# Patient Record
Sex: MALE | Race: WHITE | NOT HISPANIC OR LATINO | URBAN - METROPOLITAN AREA
[De-identification: names, ages, dates, MRNs, and addresses within clinical notes are randomized per-mention and may not be internally consistent; named-entity substitution may affect disease eponyms.]

---

## 2022-02-23 ENCOUNTER — EMERGENCY (EMERGENCY)
Facility: HOSPITAL | Age: 77
LOS: 1 days | Discharge: HOME | End: 2022-02-23
Attending: EMERGENCY MEDICINE
Payer: COMMERCIAL

## 2022-02-23 VITALS
RESPIRATION RATE: 16 BRPM | OXYGEN SATURATION: 93 % | WEIGHT: 235.01 LBS | HEART RATE: 66 BPM | TEMPERATURE: 97 F | SYSTOLIC BLOOD PRESSURE: 89 MMHG | DIASTOLIC BLOOD PRESSURE: 52 MMHG

## 2022-02-23 DIAGNOSIS — Z98.890 OTHER SPECIFIED POSTPROCEDURAL STATES: Chronic | ICD-10-CM

## 2022-02-23 LAB
ALBUMIN SERPL ELPH-MCNC: 4 G/DL — SIGNIFICANT CHANGE UP (ref 3.3–5.2)
ALP SERPL-CCNC: 93 U/L — SIGNIFICANT CHANGE UP (ref 40–120)
ALT FLD-CCNC: 8 U/L — SIGNIFICANT CHANGE UP
ANION GAP SERPL CALC-SCNC: 16 MMOL/L — SIGNIFICANT CHANGE UP (ref 5–17)
APTT BLD: 27.9 SEC — SIGNIFICANT CHANGE UP (ref 27.5–35.5)
AST SERPL-CCNC: 12 U/L — SIGNIFICANT CHANGE UP
BASOPHILS # BLD AUTO: 0.06 K/UL — SIGNIFICANT CHANGE UP (ref 0–0.2)
BASOPHILS # BLD AUTO: 0.07 K/UL — SIGNIFICANT CHANGE UP (ref 0–0.2)
BASOPHILS NFR BLD AUTO: 0.5 % — SIGNIFICANT CHANGE UP (ref 0–2)
BASOPHILS NFR BLD AUTO: 0.6 % — SIGNIFICANT CHANGE UP (ref 0–2)
BILIRUB SERPL-MCNC: 0.4 MG/DL — SIGNIFICANT CHANGE UP (ref 0.4–2)
BUN SERPL-MCNC: 31.5 MG/DL — HIGH (ref 8–20)
CALCIUM SERPL-MCNC: 9 MG/DL — SIGNIFICANT CHANGE UP (ref 8.6–10.2)
CHLORIDE SERPL-SCNC: 105 MMOL/L — SIGNIFICANT CHANGE UP (ref 98–107)
CO2 SERPL-SCNC: 25 MMOL/L — SIGNIFICANT CHANGE UP (ref 22–29)
COHGB MFR BLDV: 2.7 % — SIGNIFICANT CHANGE UP
CREAT SERPL-MCNC: 1.7 MG/DL — HIGH (ref 0.5–1.3)
EOSINOPHIL # BLD AUTO: 0.2 K/UL — SIGNIFICANT CHANGE UP (ref 0–0.5)
EOSINOPHIL # BLD AUTO: 0.27 K/UL — SIGNIFICANT CHANGE UP (ref 0–0.5)
EOSINOPHIL NFR BLD AUTO: 1.6 % — SIGNIFICANT CHANGE UP (ref 0–6)
EOSINOPHIL NFR BLD AUTO: 2.4 % — SIGNIFICANT CHANGE UP (ref 0–6)
GLUCOSE SERPL-MCNC: 121 MG/DL — HIGH (ref 70–99)
HCT VFR BLD CALC: 45.7 % — SIGNIFICANT CHANGE UP (ref 39–50)
HCT VFR BLD CALC: 46.9 % — SIGNIFICANT CHANGE UP (ref 39–50)
HGB BLD CALC-MCNC: 15.5 G/DL — SIGNIFICANT CHANGE UP (ref 12.6–17.4)
HGB BLD-MCNC: 15.1 G/DL — SIGNIFICANT CHANGE UP (ref 13–17)
HGB BLD-MCNC: 15.5 G/DL — SIGNIFICANT CHANGE UP (ref 13–17)
IMM GRANULOCYTES NFR BLD AUTO: 0.3 % — SIGNIFICANT CHANGE UP (ref 0–1.5)
IMM GRANULOCYTES NFR BLD AUTO: 0.4 % — SIGNIFICANT CHANGE UP (ref 0–1.5)
INR BLD: 1.07 RATIO — SIGNIFICANT CHANGE UP (ref 0.88–1.16)
LYMPHOCYTES # BLD AUTO: 1.97 K/UL — SIGNIFICANT CHANGE UP (ref 1–3.3)
LYMPHOCYTES # BLD AUTO: 17.4 % — SIGNIFICANT CHANGE UP (ref 13–44)
LYMPHOCYTES # BLD AUTO: 17.6 % — SIGNIFICANT CHANGE UP (ref 13–44)
LYMPHOCYTES # BLD AUTO: 2.14 K/UL — SIGNIFICANT CHANGE UP (ref 1–3.3)
MCHC RBC-ENTMCNC: 29.3 PG — SIGNIFICANT CHANGE UP (ref 27–34)
MCHC RBC-ENTMCNC: 29.4 PG — SIGNIFICANT CHANGE UP (ref 27–34)
MCHC RBC-ENTMCNC: 33 GM/DL — SIGNIFICANT CHANGE UP (ref 32–36)
MCHC RBC-ENTMCNC: 33 GM/DL — SIGNIFICANT CHANGE UP (ref 32–36)
MCV RBC AUTO: 88.6 FL — SIGNIFICANT CHANGE UP (ref 80–100)
MCV RBC AUTO: 89 FL — SIGNIFICANT CHANGE UP (ref 80–100)
MONOCYTES # BLD AUTO: 0.6 K/UL — SIGNIFICANT CHANGE UP (ref 0–0.9)
MONOCYTES # BLD AUTO: 0.74 K/UL — SIGNIFICANT CHANGE UP (ref 0–0.9)
MONOCYTES NFR BLD AUTO: 5.3 % — SIGNIFICANT CHANGE UP (ref 2–14)
MONOCYTES NFR BLD AUTO: 6.1 % — SIGNIFICANT CHANGE UP (ref 2–14)
NEUTROPHILS # BLD AUTO: 8.4 K/UL — HIGH (ref 1.8–7.4)
NEUTROPHILS # BLD AUTO: 9.01 K/UL — HIGH (ref 1.8–7.4)
NEUTROPHILS NFR BLD AUTO: 73.9 % — SIGNIFICANT CHANGE UP (ref 43–77)
NEUTROPHILS NFR BLD AUTO: 73.9 % — SIGNIFICANT CHANGE UP (ref 43–77)
NT-PROBNP SERPL-SCNC: 184 PG/ML — SIGNIFICANT CHANGE UP (ref 0–300)
PLATELET # BLD AUTO: 165 K/UL — SIGNIFICANT CHANGE UP (ref 150–400)
PLATELET # BLD AUTO: 218 K/UL — SIGNIFICANT CHANGE UP (ref 150–400)
POTASSIUM SERPL-MCNC: 4 MMOL/L — SIGNIFICANT CHANGE UP (ref 3.5–5.3)
POTASSIUM SERPL-SCNC: 4 MMOL/L — SIGNIFICANT CHANGE UP (ref 3.5–5.3)
PROT SERPL-MCNC: 6.8 G/DL — SIGNIFICANT CHANGE UP (ref 6.6–8.7)
PROTHROM AB SERPL-ACNC: 12.4 SEC — SIGNIFICANT CHANGE UP (ref 10.5–13.4)
RBC # BLD: 5.16 M/UL — SIGNIFICANT CHANGE UP (ref 4.2–5.8)
RBC # BLD: 5.27 M/UL — SIGNIFICANT CHANGE UP (ref 4.2–5.8)
RBC # FLD: 13.4 % — SIGNIFICANT CHANGE UP (ref 10.3–14.5)
RBC # FLD: 13.5 % — SIGNIFICANT CHANGE UP (ref 10.3–14.5)
SODIUM SERPL-SCNC: 146 MMOL/L — HIGH (ref 135–145)
TROPONIN T SERPL-MCNC: 0.01 NG/ML — SIGNIFICANT CHANGE UP (ref 0–0.06)
WBC # BLD: 11.35 K/UL — HIGH (ref 3.8–10.5)
WBC # BLD: 12.19 K/UL — HIGH (ref 3.8–10.5)
WBC # FLD AUTO: 11.35 K/UL — HIGH (ref 3.8–10.5)
WBC # FLD AUTO: 12.19 K/UL — HIGH (ref 3.8–10.5)

## 2022-02-23 PROCEDURE — G1004: CPT

## 2022-02-23 PROCEDURE — 99220: CPT | Mod: 25

## 2022-02-23 PROCEDURE — 70450 CT HEAD/BRAIN W/O DYE: CPT | Mod: 26,MG

## 2022-02-23 PROCEDURE — 70486 CT MAXILLOFACIAL W/O DYE: CPT | Mod: 26,MG

## 2022-02-23 PROCEDURE — 72125 CT NECK SPINE W/O DYE: CPT | Mod: 26,MG

## 2022-02-23 PROCEDURE — 71275 CT ANGIOGRAPHY CHEST: CPT | Mod: 26,ME

## 2022-02-23 PROCEDURE — 93010 ELECTROCARDIOGRAM REPORT: CPT | Mod: 76

## 2022-02-23 PROCEDURE — 12011 RPR F/E/E/N/L/M 2.5 CM/<: CPT

## 2022-02-23 PROCEDURE — 71045 X-RAY EXAM CHEST 1 VIEW: CPT | Mod: 26

## 2022-02-23 RX ORDER — CARVEDILOL PHOSPHATE 80 MG/1
25 CAPSULE, EXTENDED RELEASE ORAL EVERY 12 HOURS
Refills: 0 | Status: DISCONTINUED | OUTPATIENT
Start: 2022-02-23 | End: 2022-02-28

## 2022-02-23 RX ORDER — SODIUM CHLORIDE 9 MG/ML
500 INJECTION INTRAMUSCULAR; INTRAVENOUS; SUBCUTANEOUS ONCE
Refills: 0 | Status: COMPLETED | OUTPATIENT
Start: 2022-02-23 | End: 2022-02-23

## 2022-02-23 RX ORDER — IPRATROPIUM/ALBUTEROL SULFATE 18-103MCG
3 AEROSOL WITH ADAPTER (GRAM) INHALATION ONCE
Refills: 0 | Status: COMPLETED | OUTPATIENT
Start: 2022-02-23 | End: 2022-02-23

## 2022-02-23 RX ORDER — ATORVASTATIN CALCIUM 80 MG/1
10 TABLET, FILM COATED ORAL AT BEDTIME
Refills: 0 | Status: DISCONTINUED | OUTPATIENT
Start: 2022-02-23 | End: 2022-02-28

## 2022-02-23 RX ORDER — FOLIC ACID 0.8 MG
1 TABLET ORAL DAILY
Refills: 0 | Status: DISCONTINUED | OUTPATIENT
Start: 2022-02-23 | End: 2022-02-28

## 2022-02-23 RX ORDER — LISINOPRIL 2.5 MG/1
20 TABLET ORAL
Refills: 0 | Status: DISCONTINUED | OUTPATIENT
Start: 2022-02-23 | End: 2022-02-28

## 2022-02-23 RX ORDER — TETANUS TOXOID, REDUCED DIPHTHERIA TOXOID AND ACELLULAR PERTUSSIS VACCINE, ADSORBED 5; 2.5; 8; 8; 2.5 [IU]/.5ML; [IU]/.5ML; UG/.5ML; UG/.5ML; UG/.5ML
0.5 SUSPENSION INTRAMUSCULAR ONCE
Refills: 0 | Status: COMPLETED | OUTPATIENT
Start: 2022-02-23 | End: 2022-02-23

## 2022-02-23 RX ORDER — TAMSULOSIN HYDROCHLORIDE 0.4 MG/1
0.4 CAPSULE ORAL AT BEDTIME
Refills: 0 | Status: DISCONTINUED | OUTPATIENT
Start: 2022-02-23 | End: 2022-02-28

## 2022-02-23 RX ORDER — DILTIAZEM HCL 120 MG
120 CAPSULE, EXT RELEASE 24 HR ORAL DAILY
Refills: 0 | Status: DISCONTINUED | OUTPATIENT
Start: 2022-02-23 | End: 2022-02-28

## 2022-02-23 RX ORDER — PANTOPRAZOLE SODIUM 20 MG/1
40 TABLET, DELAYED RELEASE ORAL
Refills: 0 | Status: DISCONTINUED | OUTPATIENT
Start: 2022-02-23 | End: 2022-02-28

## 2022-02-23 RX ADMIN — TAMSULOSIN HYDROCHLORIDE 0.4 MILLIGRAM(S): 0.4 CAPSULE ORAL at 23:51

## 2022-02-23 RX ADMIN — SODIUM CHLORIDE 500 MILLILITER(S): 9 INJECTION INTRAMUSCULAR; INTRAVENOUS; SUBCUTANEOUS at 15:29

## 2022-02-23 RX ADMIN — Medication 3 MILLILITER(S): at 15:29

## 2022-02-23 RX ADMIN — Medication 1 TABLET(S): at 19:06

## 2022-02-23 RX ADMIN — Medication 1 TABLET(S): at 23:50

## 2022-02-23 RX ADMIN — TETANUS TOXOID, REDUCED DIPHTHERIA TOXOID AND ACELLULAR PERTUSSIS VACCINE, ADSORBED 0.5 MILLILITER(S): 5; 2.5; 8; 8; 2.5 SUSPENSION INTRAMUSCULAR at 15:27

## 2022-02-23 RX ADMIN — ATORVASTATIN CALCIUM 10 MILLIGRAM(S): 80 TABLET, FILM COATED ORAL at 23:51

## 2022-02-23 NOTE — ED CDU PROVIDER INITIAL DAY NOTE - NSICDXFAMILYHX_GEN_ALL_CORE_FT
FAMILY HISTORY:  Mother  Still living? Unknown  FH: dementia, Age at diagnosis: Age Unknown  FH: hypertension, Age at diagnosis: Age Unknown

## 2022-02-23 NOTE — CONSULT NOTE ADULT - PROBLEM SELECTOR RECOMMENDATION 2
Pt stable at this time with minimal crackles at base b/l, no pitting edema with normal  p-BNP (184).  Continue home medications Lisinopril 20 mg BID, Diltiazem 120 CD q am, Carvedilol 25 mg BID and Torsemide 20 mg with strict parameters  Strict I&Os. Daily weight monitor. Repeat p-BNP in AM  DASH diet    Quit smoking. Discussed in length with patient. Pt no ready to quit now Pt stable at this time with minimal crackles at base b/l, no pitting edema with normal  p-BNP (184). No edema or effusion noted on CTA of chest.   Continue home medications Lisinopril 20 mg BID, Diltiazem 120 CD q am, Carvedilol 25 mg BID and Torsemide 20 mg with strict parameters  Strict I&Os. Daily weight monitor. Repeat p-BNP in AM  Echo in AM to assess structural and functional status   Maintain K+~4 and mag ~2  DASH diet  Quit smoking. Discussed in length with patient. Pt is no ready to quit now

## 2022-02-23 NOTE — ED ADULT TRIAGE NOTE - CHIEF COMPLAINT QUOTE
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 Pt denies having had any chest pain/SOB or dizziness prior to syncope. Pt reports he feels fine at time of triage. 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 Pt denies having had any chest pain/SOB or dizziness prior to syncope. Pt reports he feels fine at time of triage but is noted to have wound to left forehead from fall.

## 2022-02-23 NOTE — ED CDU PROVIDER INITIAL DAY NOTE - PHYSICAL EXAMINATION
Gen: Alert, NAD  Head: NC, PERRL, EOMI, normal lids/conjunctiva, 1cm laceration with contusion over left forehead, 0.5 cm laceration over superior nasal bridge  ENT: B TM WNL, no nasal septal hematoma,  Neck: +supple, no tenderness/meningismus/JVD, +Trachea midline  Pulm: Bilateral BS, normal resp effort, , bilateral inspiratory and expiratory wheezing, + bilateral basilar crackles  CV: RRR, no M/R/G, +dist pulses  Abd: soft, NT/ND, +BS, no hepatosplenomegaly  Mskel:    L UE: from/nt @shoulder/elbow/wrist/hand   R UE: from/nt @shoulder/elbow/wrist/hand   L LE: from/nt @ hip/knee/ankle, 1+  pitting edema    R LE: from/nt @hip/knee/ankle, 1+ pitting edema   distal pulses intact  BACK: nt midline c/t/l/s spine.   Skin: no rash   Neuro: AAOx3, no sensory/motor deficit

## 2022-02-23 NOTE — ED PROVIDER NOTE - OBJECTIVE STATEMENT
HPI:  75 y/o M; with PMH signif for CHF, COPD, HTN, HLD ; now p/w s/p syncopal event. Pt state he did not eat today, state was outside watching [employer] procession sat in car for 15 minutes when got out from car synopsized for 1-2 when he awoke he had mild dyspnea on exertion. Pt denies chest pain or palpitations prior to syncope, N/V d  PMH:CHF, COPD, HTN, HLD  SOCIAL: +social EtOH use, + tobacco use 2 PPD HPI:  75 y/o M; with PMH signif for CHF, COPD, HTN, HLD ; now p/w s/p syncopal event. Pt state he did not eat today, state was outside watching [employer] procession sat in car for 15 minutes when got out from car synopsized for 1-2 when he awoke he had mild dyspnea on exertion. Pt denies chest pain or palpitations prior to syncope, N/V d.  also states he had an angioplasty of R LE about 1 month ago.  denies chest pain or sob.    PMH:CHF, COPD, HTN, HLD  SOCIAL: +social EtOH use, + tobacco use 2 PPD

## 2022-02-23 NOTE — CONSULT NOTE ADULT - ATTENDING COMMENTS
Pt is seen, examined, chart reviewed, d/w np/pa.  Management as outlined above.  Unwitnessed  syncope: Pt states urinary incontinence today. He woke up after this episode and his "pants were wet". Neuro evaluation  is recommended to exclude seizures.  Avoid dehydration is recommended.   Carotid Doppler:  No significant hemodynamic stenosis of either carotid artery.  Echo: Summary:  Left ventricular ejection fraction, by visual estimation, is 60 to 65%. No significant valvular abnl.  Outpatient cardiology FU is recommended.

## 2022-02-23 NOTE — ED ADULT NURSE NOTE - CHIEF COMPLAINT QUOTE
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 Pt denies having had any chest pain/SOB or dizziness prior to syncope. Pt reports he feels fine at time of triage but is noted to have wound to left forehead from fall.

## 2022-02-23 NOTE — ED PROVIDER NOTE - PHYSICAL EXAMINATION
Gen: Alert, NAD  Head: NC, PERRL, EOMI, normal lids/conjunctiva, 1cm laceration with contusion over left forehead, 0.5 cm laceration over superior nasal bridge  ENT: B TM WNL, normal hearing, patent oropharynx without erythema/exudate, uvula midline, no nasal septal hematoma,  Neck: +supple, no tenderness/meningismus/JVD, +Trachea midline  Pulm: Bilateral BS, normal resp effort, , bilateral inspiratory and expiratory wheezing, + bilateral basilar crackles  CV: RRR, no M/R/G, +dist pulses  Abd: soft, NT/ND, +BS, no hepatosplenomegaly  Mskel:    L UE: from/nt @shoulder/elbow/wrist/hand   R UE: from/nt @shoulder/elbow/wrist/hand   L LE: from/nt @ hip/knee/ankle, 1+  pitting edema    R LE: from/nt @hip/knee/ankle, 1+ pitting edema   distal pulses intact  BACK: nt midline c/t/l/s spine.   Skin: no rash   Neuro: AAOx3, no sensory/motor deficit Gen: Alert, NAD  Head: NC, PERRL, EOMI, normal lids/conjunctiva, 1cm laceration with contusion over left forehead, 0.5 cm laceration over superior nasal bridge  ENT: B TM WNL, no nasal septal hematoma,  Neck: +supple, no tenderness/meningismus/JVD, +Trachea midline  Pulm: Bilateral BS, normal resp effort, , bilateral inspiratory and expiratory wheezing, + bilateral basilar crackles  CV: RRR, no M/R/G, +dist pulses  Abd: soft, NT/ND, +BS, no hepatosplenomegaly  Mskel:    L UE: from/nt @shoulder/elbow/wrist/hand   R UE: from/nt @shoulder/elbow/wrist/hand   L LE: from/nt @ hip/knee/ankle, 1+  pitting edema    R LE: from/nt @hip/knee/ankle, 1+ pitting edema   distal pulses intact  BACK: nt midline c/t/l/s spine.   Skin: no rash   Neuro: AAOx3, no sensory/motor deficit

## 2022-02-23 NOTE — ED CDU PROVIDER INITIAL DAY NOTE - MEDICAL DECISION MAKING DETAILS
75 y/o M; with PMH signif for CHF, COPD, HTN, HLD ; now p/w s/p syncopal event. Pt state he did not eat today, state was outside watching [employer] procession sat in car for 15 minutes when got out from car syncopsized for 1-2 when he awoke he had mild dyspnea on exertion.      Syncope  - VSS at this time, may be due to decrease fluids with heat, will monitor on tele, trend troponins, cbc, SS cardio consult, Follows Dr. Patton cardio      CHF  - Torsemide 20 mg  - Lisinopril 20 mg BID  - Diltiazem 120 CD q am  - Carvedilol 25 mg BID  - 1+ pitting edema [id number], unlikely acute CHF    GERD  - Continue on Omeprazole 40 mg q am    HLD  - Continue on Atorvastatin 10 mg q bedtime    BPH  - Continue on Tamsulosin 0.4 mg q am    DVT prophylaxis SCD 77 y/o M; with PMH signif for CHF, COPD, HTN, HLD ; now p/w s/p syncopal event. Pt state he did not eat today, state was outside watching [employer] procession sat in car for 15 minutes when got out from car syncopsized for 1-2 when he awoke he had mild dyspnea on exertion.      Syncope  - VSS at this time, may be due to decrease fluids with heat, will monitor on tele, trend troponins, cbc, SS cardio consult, Follows Dr. Patton cardio    CHF  - Torsemide 20 mg  - Lisinopril 20 mg BID  - Diltiazem 120 CD q am  - Carvedilol 25 mg BID  - 1+ pitting edema [id number], unlikely acute CHF    GERD  - Continue on Omeprazole 40 mg q am    HLD  - Continue on Atorvastatin 10 mg q bedtime    BPH  - Continue on Tamsulosin 0.4 mg q am    Nasal fracture with abrasions  - No acute surgical intervention, will give OMF follow up  - Augmentin 875 mg bid for prophylaxis    COPD  - Stable at this time, sating at 99%  DVT prophylaxis SCD 75 y/o M; with PMH signif for CHF, COPD, HTN, HLD ; now p/w s/p syncopal event. Pt state he did not eat today, state was outside watching "[employer] procession sat in car for 15 minutes when got out from car synopsized for 1-2 when he awoke he had mild dyspnea on exertion.      Syncope  - VSS at this time, may be due to decrease fluids with heat, will monitor on tele, trend troponins, cbc, SS cardio consult, Follows Dr. Patton cardio    CHF  - Torsemide 20 mg  - Lisinopril 20 mg BID  - Diltiazem 120 CD q am  - Carvedilol 25 mg BID  - 1+ pitting edema [id number], unlikely acute CHF    GERD  - Continue on Omeprazole 40 mg q am    HLD  - Continue on Atorvastatin 10 mg q bedtime    BPH  - Continue on Tamsulosin 0.4 mg q am    Nasal fracture with abrasions  - No acute surgical intervention, will give OMF follow up  - Augmentin 875 mg bid for prophylaxis    COPD  - Stable at this time, sating at 99%  DVT prophylaxis SCD

## 2022-02-23 NOTE — ED CDU PROVIDER INITIAL DAY NOTE - OBJECTIVE STATEMENT
HPI:  77 y/o M; with PMH signif for CHF, COPD, HTN, HLD ; now p/w s/p syncopal event. Pt state he did not eat today, state was outside watching [employer] procession sat in car for 15 minutes when got out from car syncopsized for 1-2 when he awoke he had mild dyspnea on exertion. Pt denies chest pain or palpitations prior to syncope, N/V d.  also states he had an angioplasty of R LE about 1 month ago.  denies chest pain or sob.    PMH:CHF, COPD, HTN, HLD  SOCIAL: +social EtOH use, + tobacco use 2 PPD

## 2022-02-23 NOTE — CONSULT NOTE ADULT - PROBLEM SELECTOR RECOMMENDATION 3
Well controlled  Continue Lisinopril 20 mg BID, Diltiazem 120 CD q and Carvedilol 25 mg BID with strict parameters  Monitor BP  DASH diet

## 2022-02-23 NOTE — CONSULT NOTE ADULT - PROBLEM SELECTOR RECOMMENDATION 9
Unwitnessed  syncope.   Neuro evaluation to ro seizures. Pt states urinary incontinence today. He woke up after this episode and his "pants were wet". No hx of seizures or prior syncopal episodes   Telemetry  Trend trops x 3 q6. Trend CE  Serial EKGs  Orthostatic BP  Encourage PO intake with caution since pt has hx of CHF. Monitor for volume overload Unwitnessed  syncope.   Pt states urinary incontinence today. He woke up after this episode and his "pants were wet". No hx of seizures or prior syncopal episodes.  Pt didn't eat or drink any fluids before this episode   Pt is dehydrated. Encourage PO intake or IVF with caution since pt has hx of CHF. Monitor for volume overload  Neuro evaluation to ro seizures   Telemetry to monitor for arrhythmias   Trend trops x 3 q6. Trend CE  Serial EKGs  Orthostatic BP  Maintain K+~4 and mag ~2  Echo in AM to assess structural and functional status   Carotid Doppler Unwitnessed  syncope.   Pt states urinary incontinence today. He woke up after this episode and his "pants were wet". No hx of seizures or prior syncopal episodes.  Pt didn't eat or drink any fluids before this episode   Pt is dehydrated. Encourage PO intake or IVF with caution since pt has hx of CHF. Monitor for volume overload  Neuro evaluation to ro seizures   Telemetry to monitor for arrhythmias   Trend trops x 3 q6. Trend CE  Serial EKGs  Orthostatic BP  A1C, TFTs, and lipid panel fasting to ro comorbidities   Maintain K+~4 and mag ~2  Echo in AM to assess structural and functional status   Carotid Doppler

## 2022-02-23 NOTE — ED PROVIDER NOTE - CLINICAL SUMMARY MEDICAL DECISION MAKING FREE TEXT BOX
patient s/p syncopal episode, head trauma with laceration (repaired with dermabond).  ekg normal, labs normal, ct with only nasal fx, no acute ich, will place in ob sof cardiac monitoring for syncope.

## 2022-02-23 NOTE — CONSULT NOTE ADULT - ASSESSMENT
77 y/o M presents to ED s/p syncopal event this afternoon. Pt noted he urinated himself (seizures).  Pt states he didn't eat or drink any fluids before this episode.

## 2022-02-23 NOTE — ED PROVIDER NOTE - NS ED ROS FT
Constitutional: (-) fever  (-)chills  (-)sweats  Eyes/ENT: (-) blurry vision, (-) epistaxis  (-)rhinorrhea   (-) sore throat    Cardiovascular: (-) chest pain, (-) palpitations (-) edema (+) syncope   Respiratory: (-) cough, (-) shortness of breath (+) dyspnea   Gastrointestinal: (-)nausea  (-)vomiting, (-) diarrhea  (-) abdominal pain   :  (-)dysuria, (-)frequency, (-)urgency, (-)hematuria  Musculoskeletal: (-) neck pain, (-) back pain, (-) joint pain  Integumentary: (-) rash, (-) edema  Neurological: (-) headache, (-) altered mental status

## 2022-02-23 NOTE — ED CDU PROVIDER INITIAL DAY NOTE - NSICDXPASTMEDICALHX_GEN_ALL_CORE_FT
PAST MEDICAL HISTORY:  Chronic CHF     History of BPH     HLD (hyperlipidemia)     Hypertension

## 2022-02-23 NOTE — ED ADULT NURSE NOTE - NSIMPLEMENTINTERV_GEN_ALL_ED
Implemented All Fall Risk Interventions:  Axis to call system. Call bell, personal items and telephone within reach. Instruct patient to call for assistance. Room bathroom lighting operational. Non-slip footwear when patient is off stretcher. Physically safe environment: no spills, clutter or unnecessary equipment. Stretcher in lowest position, wheels locked, appropriate side rails in place. Provide visual cue, wrist band, yellow gown, etc. Monitor gait and stability. Monitor for mental status changes and reorient to person, place, and time. Review medications for side effects contributing to fall risk. Reinforce activity limits and safety measures with patient and family.

## 2022-02-23 NOTE — CONSULT NOTE ADULT - SUBJECTIVE AND OBJECTIVE BOX
[place] [facility]                                                        Office: [address] [address]                                                       Telephone: [phone]. Fax:[phone]    CARDIOLOGY CONSULTATION NOTE                                                                                             History obtained by: Patient and medical record   obtained: No    HPI: 75 y/o M with PMHx of CHF, COPD, HTN, HLD and PAD (sp toe amputation R foot) presents to ED s/p syncopal event this afternoon. 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. Pt was feeling tired and went to his car to take a nap. Woke up, got out of his car and that is the last thing he remembers. After that, he woke up on the floor surrounding by people and was BIBA to this ED. Pt noted he urinated himself (seizures). Pt denies hx of seizures or prior episodes of syncope. Pt states he didn't eat or drink any fluids before this episode. Pt denies chest pain, dizziness, palpitations, or SOB prior/before this episode.    Cardiologist: Dr Myah Cespedes     REVIEW OF SYMPTOMS: Asymptomatic   Cardiovascular:  No chest pain,  No dyspnea,  No fatigue,  No palpitations, No dizziness, No Orthopnea, No Paroxsymal nocturnal dyspnea.  Respiratory:  No Dyspnea, No cough.  Genitourinary:  No dysuria, no hematuria.  Gastrointestinal:  No nausea, no vomiting. No diarrhea.  No abdominal pain.   Neurological: No headache, no dizziness, no slurred speech.  Psychiatric: No agitation, no anxiety.    ALL OTHER REVIEW OF SYSTEMS ARE NEGATIVE.    Allergies  No Known Allergies    CURRENT MEDICATIONS:  carvedilol 25 milliGRAM(s) Oral every 12 hours  diltiazem    milliGRAM(s) Oral daily  lisinopril 20 milliGRAM(s) Oral two times a day  tamsulosin 0.4 milliGRAM(s) Oral at bedtime  torsemide 20 milliGRAM(s) Oral daily  pantoprazole    Tablet  amoxicillin  875 milliGRAM(s)/clavulanate  atorvastatin  folic acid    Home Medications  carvedilol 25 milliGRAM(s) Oral every 12 hours  diltiazem    milliGRAM(s) Oral daily  lisinopril 20 milliGRAM(s) Oral two times a day  tamsulosin 0.4 milliGRAM(s) Oral at bedtime  torsemide 20 milliGRAM(s) Oral daily  Atorvastatin 10 mg q bedtime    Past Medical History  Chronic CHF  Hypertension  HLD (hyperlipidemia)  History of BPH  PAD    Past Surgical History  History of percutaneous angioplasty (patient denies having angioplasty in the past)   Toe amputation R foot     FAMILY HISTORY:  FH: dementia (Mother)  FH: hypertension (Mother)  Father stroke     Social History: + smoker 1/2 PPD. Social alcohol intake.  Denies drugs use:    Vital Signs Last 24 Hrs  T(C): 36.8 (2022 22:22), Max: 36.8 (2022 22:22)  T(F): 98.2 (2022 22:22), Max: 98.2 (2022 22:22)  HR: 65 (2022 22:22) (64 - 70)  BP: 103/88 (2022 22:22) (89/52 - 127/60)  BP(mean): --  RR: 20 (2022 22:22) (16 - 20)  SpO2: 96% (2022 22:22) (93% - 99%)    PHYSICAL EXAM:  Constitutional: Comfortable . No acute distress.   HEENT: + 1cm laceration with contusion over left forehead, 0.5 cm laceration over superior nasal bridge,  neck is supple . no JVD.  PEERL   CNS: A&O x3. No focal neurologic deficits.   Respiratory: + wheezing b/l, + minimal crackles at base b/l. No rhonchi   Cardiovascular: S1S2 RRR. No murmur or rubs  Gastrointestinal: Soft non-tender and non distended . +Bowel sounds.   Extremities: No pitting  edema x 4 extremities  Psychiatric: Calm . no agitation.    LABS:                        15.1   12.19 )-----------( 165      ( 2022 23:26 )             45.7         146<H>  |  105  |  31.5<H>  ----------------------------<  121<H>  4.0   |  25.0  |  1.70<H>    Ca    9.0      2022 14:27    TPro  6.8  /  Alb  4.0  /  TBili  0.4  /  DBili  x   /  AST  12  /  ALT  8   /  AlkPhos  93      CARDIAC MARKERS ( 2022 14:27 )  x     / 0.01 ng/mL / x     / x     / x        ;p-BNP=Serum Pro-Brain Natriuretic Peptide: 184 pg/mL ( @ 14:27)    PT/INR - ( 2022 14:27 )   PT: 12.4 sec;   INR: 1.07 ratio    PTT - ( 2022 14:27 )  PTT:27.9 sec    EKG: Reviewed by me.     RADIOLOGY & ADDITIONAL STUDIES:    CT Head No Cont (22 @ 16:48) >  IMPRESSION:  CT brain:  No acute intracranial hemorrhage, brain edema, or mass effect.  No displaced calvarial fracture.    CT maxillofacial bones:  Acute mildly depressed fracture of the right nasal bone.  Intraorbital contents unremarkable.    CT cervical spine:  No acute fracture or traumatic subluxation.  No prevertebral soft tissue swelling.  Degenerative changes.  < end of copied text >    CT Angio Chest PE Protocol w/ IV Cont (22 @ 20:35) >  IMPRESSION:  No pulmonary embolus.    Mediastinum and Heart: Aorta and pulmonary arteries are normal in size.   Thyroid gland is unremarkable. No lymphadenopathy. No pericardial   effusion.    Lungs, Pleura, and Airways: There is no pulmonary embolus. No   consolidations, edema, effusion, or pneumothorax. Centrilobular emphysema   is present.  < end of copied text >      Preliminary evaluation, please await official recommendations     Assessment and recommendations are final when note is signed by the attending.                                                                        [place] [facility]                                                        Office: [address] [address]                                                       Telephone: [phone]. Fax:[phone]    CARDIOLOGY CONSULTATION NOTE                                                                                             History obtained by: Patient and medical record   obtained: No    HPI: 77 y/o M with PMHx of CHF, COPD, HTN, HLD and PAD (sp toe amputation R foot) presents to ED s/p syncopal event this afternoon. 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 Pt was feeling tired and went to his car to take a nap. Woke up, got out of his car and that is the last thing he remembers. After that, he woke up on the floor surrounding by people and was BIBA to this ED. Pt noted he urinated himself (seizures). Pt denies hx of seizures or prior episodes of syncope. Pt states he didn't eat or drink any fluids before this episode. Pt denies chest pain, dizziness, palpitations, or SOB prior/before this episode.    Cardiologist: Dr Myah Cespedes     REVIEW OF SYMPTOMS: Asymptomatic   Cardiovascular:  No chest pain,  No dyspnea,  No fatigue,  No palpitations, No dizziness, No Orthopnea, No Paroxsymal nocturnal dyspnea.  Respiratory:  No Dyspnea, No cough.  Genitourinary:  No dysuria, no hematuria.  Gastrointestinal:  No nausea, no vomiting. No diarrhea.  No abdominal pain.   Neurological: No headache, no dizziness, no slurred speech.  Psychiatric: No agitation, no anxiety.    ALL OTHER REVIEW OF SYSTEMS ARE NEGATIVE.    Allergies  No Known Allergies    CURRENT MEDICATIONS:  carvedilol 25 milliGRAM(s) Oral every 12 hours  diltiazem    milliGRAM(s) Oral daily  lisinopril 20 milliGRAM(s) Oral two times a day  tamsulosin 0.4 milliGRAM(s) Oral at bedtime  torsemide 20 milliGRAM(s) Oral daily  pantoprazole    Tablet  amoxicillin  875 milliGRAM(s)/clavulanate  atorvastatin  folic acid    Home Medications  carvedilol 25 milliGRAM(s) Oral every 12 hours  diltiazem    milliGRAM(s) Oral daily  lisinopril 20 milliGRAM(s) Oral two times a day  tamsulosin 0.4 milliGRAM(s) Oral at bedtime  torsemide 20 milliGRAM(s) Oral daily  Atorvastatin 10 mg q bedtime    Past Medical History  Chronic CHF  Hypertension  HLD (hyperlipidemia)  History of BPH  PAD    Past Surgical History  History of percutaneous angioplasty (patient denies having angioplasty in the past)   Toe amputation R foot     FAMILY HISTORY:  FH: dementia (Mother)  FH: hypertension (Mother)  Father stroke     Social History: + smoker 1/2 PPD. Social alcohol intake.  Denies drugs use:    Vital Signs Last 24 Hrs  T(C): 36.8 (2022 22:22), Max: 36.8 (2022 22:22)  T(F): 98.2 (2022 22:22), Max: 98.2 (2022 22:22)  HR: 65 (2022 22:22) (64 - 70)  BP: 103/88 (2022 22:22) (89/52 - 127/60)  BP(mean): --  RR: 20 (2022 22:22) (16 - 20)  SpO2: 96% (2022 22:22) (93% - 99%)    PHYSICAL EXAM:  Constitutional: Comfortable . No acute distress.   HEENT: + 1cm laceration with contusion over left forehead, 0.5 cm laceration over superior nasal bridge,  neck is supple . no JVD.  PEERL   CNS: A&O x3. No focal neurologic deficits.   Respiratory: + wheezing b/l, + minimal crackles at base b/l. No rhonchi   Cardiovascular: S1S2 RRR. No murmur or rubs  Gastrointestinal: Soft non-tender and non distended . +Bowel sounds.   Extremities: No pitting  edema x 4 extremities  Psychiatric: Calm . no agitation.    LABS:                        15.1   12.19 )-----------( 165      ( 2022 23:26 )             45.7         146<H>  |  105  |  31.5<H>  ----------------------------<  121<H>  4.0   |  25.0  |  1.70<H>    Ca    9.0      2022 14:27    TPro  6.8  /  Alb  4.0  /  TBili  0.4  /  DBili  x   /  AST  12  /  ALT  8   /  AlkPhos  93      CARDIAC MARKERS ( 2022 14:27 )  x     / 0.01 ng/mL / x     / x     / x        ;p-BNP=Serum Pro-Brain Natriuretic Peptide: 184 pg/mL ( @ 14:27)    PT/INR - ( 2022 14:27 )   PT: 12.4 sec;   INR: 1.07 ratio    PTT - ( 2022 14:27 )  PTT:27.9 sec    EKG: Reviewed by me.     RADIOLOGY & ADDITIONAL STUDIES:    CT Head No Cont (22 @ 16:48) >  IMPRESSION:  CT brain:  No acute intracranial hemorrhage, brain edema, or mass effect.  No displaced calvarial fracture.    CT maxillofacial bones:  Acute mildly depressed fracture of the right nasal bone.  Intraorbital contents unremarkable.    CT cervical spine:  No acute fracture or traumatic subluxation.  No prevertebral soft tissue swelling.  Degenerative changes.  < end of copied text >    CT Angio Chest PE Protocol w/ IV Cont (22 @ 20:35) >  IMPRESSION:  No pulmonary embolus.    Mediastinum and Heart: Aorta and pulmonary arteries are normal in size.   Thyroid gland is unremarkable. No lymphadenopathy. No pericardial   effusion.    Lungs, Pleura, and Airways: There is no pulmonary embolus. No   consolidations, edema, effusion, or pneumothorax. Centrilobular emphysema   is present.  < end of copied text >      Preliminary evaluation, please await official recommendations     Assessment and recommendations are final when note is signed by the attending.                                                                        [place] [facility]                                                        Office: [address] [address]                                                       Telephone: [phone]. Fax:[phone]    CARDIOLOGY CONSULTATION NOTE                                                                                             History obtained by: Patient and medical record   obtained: No    HPI: 75 y/o M with PMHx of CHF, COPD, HTN, HLD and PAD (sp toe amputation R foot) presents to ED s/p syncopal event this afternoon. 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 Pt was feeling tired and went to his car to take a nap. Woke up, got out of his car and that is the last thing he remembers. After that, he woke up on the floor surrounding by people and was BIBA to this ED. Pt noted he urinated himself (seizures). Pt denies hx of seizures or prior episodes of syncope. Pt states he didn't eat or drink any fluids before this episode. Pt denies chest pain, dizziness, palpitations, or SOB prior/before this episode.    Cardiologist: Dr Myah Cespedes     REVIEW OF SYMPTOMS: Asymptomatic   Cardiovascular:  No chest pain,  No dyspnea,  No fatigue,  No palpitations, No dizziness, No Orthopnea, No Paroxsymal nocturnal dyspnea.  Respiratory:  No Dyspnea, No cough.  Genitourinary:  No dysuria, no hematuria.  Gastrointestinal:  No nausea, no vomiting. No diarrhea.  No abdominal pain.   Neurological: No headache, no dizziness, no slurred speech.  Psychiatric: No agitation, no anxiety.    ALL OTHER REVIEW OF SYSTEMS ARE NEGATIVE.    Allergies  No Known Allergies    CURRENT MEDICATIONS:  carvedilol 25 milliGRAM(s) Oral every 12 hours  diltiazem    milliGRAM(s) Oral daily  lisinopril 20 milliGRAM(s) Oral two times a day  tamsulosin 0.4 milliGRAM(s) Oral at bedtime  torsemide 20 milliGRAM(s) Oral daily  pantoprazole    Tablet  amoxicillin  875 milliGRAM(s)/clavulanate  atorvastatin  folic acid    Home Medications  carvedilol 25 milliGRAM(s) Oral every 12 hours  diltiazem    milliGRAM(s) Oral daily  lisinopril 20 milliGRAM(s) Oral two times a day  tamsulosin 0.4 milliGRAM(s) Oral at bedtime  torsemide 20 milliGRAM(s) Oral daily  Atorvastatin 10 mg q bedtime    Past Medical History  Chronic CHF  Hypertension  HLD (hyperlipidemia)  History of BPH  PAD    Past Surgical History  History of percutaneous angioplasty (patient denies having angioplasty in the past)   Toe amputation R foot     FAMILY HISTORY:  FH: dementia (Mother)  FH: hypertension (Mother)  Father stroke     Social History: + smoker 1/2 PPD. Social alcohol intake.  Denies drugs use:    Vital Signs Last 24 Hrs  T(C): 36.8 (2022 22:22), Max: 36.8 (2022 22:22)  T(F): 98.2 (2022 22:22), Max: 98.2 (2022 22:22)  HR: 65 (2022 22:22) (64 - 70)  BP: 103/88 (2022 22:22) (89/52 - 127/60)  BP(mean): --  RR: 20 (2022 22:22) (16 - 20)  SpO2: 96% (2022 22:22) (93% - 99%)    PHYSICAL EXAM:  Constitutional: Comfortable . No acute distress.   HEENT: + 1cm laceration with contusion over left forehead, 0.5 cm laceration over superior nasal bridge,  neck is supple . no JVD.  PEERL   CNS: A&O x3. No focal neurologic deficits.   Respiratory: + wheezing b/l, + minimal crackles at base b/l. No rhonchi   Cardiovascular: S1S2 RRR. No murmur or rubs  Gastrointestinal: Soft non-tender and non distended . +Bowel sounds.   Extremities: No pitting  edema x 4 extremities  Psychiatric: Calm . no agitation.    LABS:                        15.1   12.19 )-----------( 165      ( 2022 23:26 )             45.7         146<H>  |  105  |  31.5<H>  ----------------------------<  121<H>  4.0   |  25.0  |  1.70<H>    Ca    9.0      2022 14:27    TPro  6.8  /  Alb  4.0  /  TBili  0.4  /  DBili  x   /  AST  12  /  ALT  8   /  AlkPhos  93      CARDIAC MARKERS ( 2022 14:27 )  x     / 0.01 ng/mL / x     / x     / x        ;p-BNP=Serum Pro-Brain Natriuretic Peptide: 184 pg/mL ( @ 14:27)    PT/INR - ( 2022 14:27 )   PT: 12.4 sec;   INR: 1.07 ratio    PTT - ( 2022 14:27 )  PTT:27.9 sec    EKG: NSR. Nonischemic     RADIOLOGY & ADDITIONAL STUDIES:    CT Head No Cont (22 @ 16:48) >  IMPRESSION:  CT brain:  No acute intracranial hemorrhage, brain edema, or mass effect.  No displaced calvarial fracture.    CT maxillofacial bones:  Acute mildly depressed fracture of the right nasal bone.  Intraorbital contents unremarkable.    CT cervical spine:  No acute fracture or traumatic subluxation.  No prevertebral soft tissue swelling.  Degenerative changes.  < end of copied text >    CT Angio Chest PE Protocol w/ IV Cont (22 @ 20:35) >  IMPRESSION:  No pulmonary embolus.    Mediastinum and Heart: Aorta and pulmonary arteries are normal in size.   Thyroid gland is unremarkable. No lymphadenopathy. No pericardial   effusion.    Lungs, Pleura, and Airways: There is no pulmonary embolus. No   consolidations, edema, effusion, or pneumothorax. Centrilobular emphysema   is present.  < end of copied text >      Preliminary evaluation, please await official recommendations     Assessment and recommendations are final when note is signed by the attending.

## 2022-02-23 NOTE — CONSULT NOTE ADULT - PROBLEM SELECTOR RECOMMENDATION 4
Lipid panel fasting  Continue Atorvastatin 10 mg OD  DASH diet    Further recommendations base on above findings Lipid panel fasting  Continue Atorvastatin 10 mg OD. Monitor LFTs   DASH diet    Further recommendations base on above findings Lipid panel fasting  Continue Atorvastatin 10 mg OD. Monitor LFTs   DASH diet    Further recommendations base on above findings  Case discussed with Dr Kline

## 2022-02-23 NOTE — ED CDU PROVIDER INITIAL DAY NOTE - ATTENDING CONTRIBUTION TO CARE
I agree with the PA's note and was available for any issues/concerns. I was directly involved in patient care. My brief overall assessment is as follows:    76 year old male PMHx HTN, COPD c/o syncope; initial work up reviewed; admit to obs for work up

## 2022-02-24 VITALS
RESPIRATION RATE: 18 BRPM | TEMPERATURE: 98 F | OXYGEN SATURATION: 95 % | SYSTOLIC BLOOD PRESSURE: 120 MMHG | DIASTOLIC BLOOD PRESSURE: 67 MMHG | HEART RATE: 71 BPM

## 2022-02-24 DIAGNOSIS — I50.9 HEART FAILURE, UNSPECIFIED: ICD-10-CM

## 2022-02-24 DIAGNOSIS — I10 ESSENTIAL (PRIMARY) HYPERTENSION: ICD-10-CM

## 2022-02-24 DIAGNOSIS — R55 SYNCOPE AND COLLAPSE: ICD-10-CM

## 2022-02-24 DIAGNOSIS — E78.5 HYPERLIPIDEMIA, UNSPECIFIED: ICD-10-CM

## 2022-02-24 LAB
APPEARANCE UR: CLEAR — SIGNIFICANT CHANGE UP
BILIRUB UR-MCNC: NEGATIVE — SIGNIFICANT CHANGE UP
CHOLEST SERPL-MCNC: 128 MG/DL — SIGNIFICANT CHANGE UP
COLOR SPEC: YELLOW — SIGNIFICANT CHANGE UP
DIFF PNL FLD: NEGATIVE — SIGNIFICANT CHANGE UP
GLUCOSE UR QL: NEGATIVE MG/DL — SIGNIFICANT CHANGE UP
HDLC SERPL-MCNC: 28 MG/DL — LOW
KETONES UR-MCNC: NEGATIVE — SIGNIFICANT CHANGE UP
LEUKOCYTE ESTERASE UR-ACNC: NEGATIVE — SIGNIFICANT CHANGE UP
LIPID PNL WITH DIRECT LDL SERPL: 72 MG/DL — SIGNIFICANT CHANGE UP
NITRITE UR-MCNC: NEGATIVE — SIGNIFICANT CHANGE UP
NON HDL CHOLESTEROL: 100 MG/DL — SIGNIFICANT CHANGE UP
PH UR: 5 — SIGNIFICANT CHANGE UP (ref 5–8)
PROT UR-MCNC: NEGATIVE — SIGNIFICANT CHANGE UP
SP GR SPEC: 1.01 — SIGNIFICANT CHANGE UP (ref 1.01–1.02)
TRIGL SERPL-MCNC: 142 MG/DL — SIGNIFICANT CHANGE UP
TROPONIN T SERPL-MCNC: 0.01 NG/ML — SIGNIFICANT CHANGE UP (ref 0–0.06)
TROPONIN T SERPL-MCNC: 0.02 NG/ML — SIGNIFICANT CHANGE UP (ref 0–0.06)
UROBILINOGEN FLD QL: NEGATIVE MG/DL — SIGNIFICANT CHANGE UP

## 2022-02-24 PROCEDURE — 70450 CT HEAD/BRAIN W/O DYE: CPT | Mod: MG

## 2022-02-24 PROCEDURE — 85730 THROMBOPLASTIN TIME PARTIAL: CPT

## 2022-02-24 PROCEDURE — 70486 CT MAXILLOFACIAL W/O DYE: CPT | Mod: MG

## 2022-02-24 PROCEDURE — 85379 FIBRIN DEGRADATION QUANT: CPT

## 2022-02-24 PROCEDURE — 12011 RPR F/E/E/N/L/M 2.5 CM/<: CPT

## 2022-02-24 PROCEDURE — 94640 AIRWAY INHALATION TREATMENT: CPT

## 2022-02-24 PROCEDURE — 99285 EMERGENCY DEPT VISIT HI MDM: CPT | Mod: 25

## 2022-02-24 PROCEDURE — 99217: CPT

## 2022-02-24 PROCEDURE — 87086 URINE CULTURE/COLONY COUNT: CPT

## 2022-02-24 PROCEDURE — 85610 PROTHROMBIN TIME: CPT

## 2022-02-24 PROCEDURE — 83880 ASSAY OF NATRIURETIC PEPTIDE: CPT

## 2022-02-24 PROCEDURE — 93306 TTE W/DOPPLER COMPLETE: CPT

## 2022-02-24 PROCEDURE — 93880 EXTRACRANIAL BILAT STUDY: CPT | Mod: 26

## 2022-02-24 PROCEDURE — G1004: CPT

## 2022-02-24 PROCEDURE — 93010 ELECTROCARDIOGRAM REPORT: CPT

## 2022-02-24 PROCEDURE — 82375 ASSAY CARBOXYHB QUANT: CPT

## 2022-02-24 PROCEDURE — 90715 TDAP VACCINE 7 YRS/> IM: CPT

## 2022-02-24 PROCEDURE — 84484 ASSAY OF TROPONIN QUANT: CPT

## 2022-02-24 PROCEDURE — 99285 EMERGENCY DEPT VISIT HI MDM: CPT

## 2022-02-24 PROCEDURE — 72125 CT NECK SPINE W/O DYE: CPT | Mod: MG

## 2022-02-24 PROCEDURE — 71275 CT ANGIOGRAPHY CHEST: CPT | Mod: ME

## 2022-02-24 PROCEDURE — 85025 COMPLETE CBC W/AUTO DIFF WBC: CPT

## 2022-02-24 PROCEDURE — 90471 IMMUNIZATION ADMIN: CPT

## 2022-02-24 PROCEDURE — G0378: CPT

## 2022-02-24 PROCEDURE — 93306 TTE W/DOPPLER COMPLETE: CPT | Mod: 26

## 2022-02-24 PROCEDURE — 93880 EXTRACRANIAL BILAT STUDY: CPT

## 2022-02-24 PROCEDURE — 36415 COLL VENOUS BLD VENIPUNCTURE: CPT

## 2022-02-24 PROCEDURE — 71045 X-RAY EXAM CHEST 1 VIEW: CPT

## 2022-02-24 PROCEDURE — 82962 GLUCOSE BLOOD TEST: CPT

## 2022-02-24 PROCEDURE — 93005 ELECTROCARDIOGRAM TRACING: CPT

## 2022-02-24 PROCEDURE — 80061 LIPID PANEL: CPT

## 2022-02-24 PROCEDURE — 81003 URINALYSIS AUTO W/O SCOPE: CPT

## 2022-02-24 PROCEDURE — 80053 COMPREHEN METABOLIC PANEL: CPT

## 2022-02-24 RX ORDER — SODIUM CHLORIDE 9 MG/ML
1000 INJECTION INTRAMUSCULAR; INTRAVENOUS; SUBCUTANEOUS
Refills: 0 | Status: DISCONTINUED | OUTPATIENT
Start: 2022-02-24 | End: 2022-02-28

## 2022-02-24 RX ADMIN — Medication 1 TABLET(S): at 12:05

## 2022-02-24 RX ADMIN — Medication 120 MILLIGRAM(S): at 05:14

## 2022-02-24 RX ADMIN — Medication 20 MILLIGRAM(S): at 05:14

## 2022-02-24 RX ADMIN — SODIUM CHLORIDE 75 MILLILITER(S): 9 INJECTION INTRAMUSCULAR; INTRAVENOUS; SUBCUTANEOUS at 05:20

## 2022-02-24 RX ADMIN — CARVEDILOL PHOSPHATE 25 MILLIGRAM(S): 80 CAPSULE, EXTENDED RELEASE ORAL at 05:14

## 2022-02-24 RX ADMIN — Medication 1 MILLIGRAM(S): at 12:05

## 2022-02-24 RX ADMIN — PANTOPRAZOLE SODIUM 40 MILLIGRAM(S): 20 TABLET, DELAYED RELEASE ORAL at 05:14

## 2022-02-24 RX ADMIN — LISINOPRIL 20 MILLIGRAM(S): 2.5 TABLET ORAL at 05:14

## 2022-02-24 NOTE — ED ADULT NURSE REASSESSMENT NOTE - NS ED NURSE REASSESS COMMENT FT1
Patient resting comfortably, pending US. VS stable, telemetry monitoring continued, NPO since midnight, NS started at 100ml/hr. Patient safety maintained, fall precautions maintained, no acute distress noted.
assumed care of pt @ 0730. pt AOx4 in NAD. pt ambulatory to bathroom in no acute distress. denies dizziness, SOB, chest discomfort, lightheadedness. respirations even and unlabored. abd soft and nondistended. abrasions noted to forehead, no active bleeding noted. otherwise, skin WNL for race. pt denies any discomfort. pt remains on cardiac monitor and . safety maintained. awaiting US and echo.
Pt received at 2222, A&O4, denies any chest pain or SOB. VS as charted. Abrasion on left sided forehead and nose bridge as well as right knee. Telemetry monitoring in place. Patient safety maintained, fall precautions maintained, no acute distress noted.

## 2022-02-24 NOTE — ED CDU PROVIDER SUBSEQUENT DAY NOTE - ATTENDING CONTRIBUTION TO CARE
In obs for syncope protocol  feeling well  had echo and carotid dop. this am  will continue care plan and follow results

## 2022-02-24 NOTE — ED CDU PROVIDER DISPOSITION NOTE - NSFOLLOWUPCLINICS_GEN_ALL_ED_FT
[facility]  Cardiology  [address], [address]  [place] [address]  Phone: [phone]  Fax:

## 2022-02-24 NOTE — ED CDU PROVIDER DISPOSITION NOTE - PATIENT PORTAL LINK FT
You can access the FollowMyHealth Patient Portal offered by [facility] by registering at the following website: [email or web address]. By joining [employer]’s FollowMyHealth portal, you will also be able to view your health information using other applications (apps) compatible with our system.

## 2022-02-24 NOTE — ED CDU PROVIDER DISPOSITION NOTE - CLINICAL COURSE
Patient presenting with syncopal episode from home. Placed in obs for cardiology evaluation. Workup unremarkable. To d/c to home.

## 2022-02-24 NOTE — ED CDU PROVIDER DISPOSITION NOTE - CARE PROVIDER_API CALL
Kristian Cabrera; PhD)  Neurology; Vascular Neurology  [address] [place] [address]  Phone: [phone]  Fax: [phone]  Follow Up Time:

## 2022-02-24 NOTE — ED CDU PROVIDER DISPOSITION NOTE - NSFOLLOWUPINSTRUCTIONS_ED_ALL_ED_FT
[facility]   Office: [address] [address]  Telephone: [phone]. Fax:[phone]    Please follow up with the cardiologist and neurologist   Take your home meds as prescribed  Stay well hydrated      Syncope    WHAT YOU NEED TO KNOW:    What is syncope? Syncope is also called fainting or passing out. Syncope is a sudden, temporary loss of consciousness, followed by a fall from a standing or sitting position. A syncope episode is usually short.    What causes syncope? Syncope is caused by a decrease in blood flow to the brain. When blood flow to the brain decreases, oxygen to the brain also decreases. Any of the following conditions may cause syncope:  •A heart condition, such as a narrow artery or an irregular heartbeat      •A medical condition such as severe anemia, uncontrolled diabetes, or a nerve disorder      •Dehydration      •Certain medicines, such as blood pressure medicines, heart medicines, or antidepressants      •Problems with the blood vessels of your brain      •A rapid drop in blood pressure after a body position change, such as moving from lying to sitting or standing      •Straining during bowel movements, a cough or sneeze, or a stressful or fearful situation      •A medical condition that affects your lungs, such as pneumonia or asthma, or hyperventilation (breathing too quickly)      What signs and symptoms may occur before syncope?   •Cold, clammy, and sweaty skin       •Fast breathing and a racing, pounding heartbeat       •Feeling more tired than usual       •Nausea, a warm feeling, and sweating      •A headache, or feeling lightheaded or dizzy      •Tingling sensation or numbness       •Spots in front of your eyes, blurred vision, or double vision       How is the cause of syncope diagnosed? Your healthcare provider will examine you. He or she will ask if you have other medical conditions. He or she may order the following tests to find out what is causing your symptoms:   •Blood tests may be done to check your electrolyte levels, such as sodium. A problem with your electrolytes can lead to syncope.      •Telemetry is continuous monitoring of your heart rhythm. Sticky pads placed on your skin connect to an EKG machine that records your heart rhythm.      •An echocardiogram is a type of ultrasound. Sound waves are used to show the structure and function of your heart.      •A stress test may show the changes that take place in your heart while it is under stress. Stress may be placed on your heart with exercise or medicine. Ask for more information about this test.      •A tilt table test is used to check your heart and your blood pressure when you change positions.      •A Holter monitor is also called a portable electrocardiography (EKG) monitor. It shows your heart's electrical activity while you do your usual activities. The monitor is a small battery-operated device that you wear. It will show how fast your heart beats and if it beats in a regular pattern. Your healthcare provider may recommend this if you have syncope often over 2 to 3 days.  Holter Monitor           How is syncope treated? Treatment depends on the cause of your syncope. To prevent syncope from happening again, you may need any of the following:   •Medicines may be needed to help your heart pump strongly and regularly. Your healthcare provider may also make changes to any medicines that are causing syncope.       •Tilt training involves training yourself to stand for 10 to 30 minutes each day against a wall. This helps your body decrease the effects of posture changes and reduces the number of fainting spells.       What can I do to manage syncope?   •Keep a record of your syncope episodes. Include your symptoms and your activity before and after the episode. The record can help your healthcare provider find the cause of your syncope and help you manage episodes.      •Sit or lie down when needed. This includes when you feel dizzy, your throat is getting tight, and your vision changes. Raise your legs above the level of your heart.      •Take slow, deep breaths if you start to breathe faster with anxiety or fear. This can help decrease dizziness and the feeling that you might faint.       •Check your blood pressure often. This is important if you take medicine to lower your blood pressure. Check your blood pressure when you are lying down and when you are standing. Ask how often to check during the day. Keep a record of your blood pressure numbers. Your healthcare provider may use the record to help plan your treatment.  How to take a Blood Pressure           What can I do to prevent a syncope episode?   •Move slowly and let yourself get used to one position before you move to another position. This is very important when you change from a lying or sitting position to a standing position. Take some deep breaths before you stand up from a lying position. Stand up slowly. Sudden movements may cause a fainting spell. Sit on the side of the bed or couch for a few minutes before you stand up. If you are on bedrest, try to be upright for about 2 hours each day, or as directed. Do not lock your legs if you are standing for a long period of time. Move your legs and bend your knees to keep blood flowing.      •Follow your healthcare provider's recommendations. Your provider may recommend that you drink more liquids to prevent dehydration. You may also need to have more salt to keep your blood pressure from dropping too low and causing syncope. Your provider will tell you how much liquid and sodium to have each day. He or she will also tell you how much physical activity is safe for you. This will depend on what is causing your syncope.      •Watch for signs of low blood sugar. These include hunger, nervousness, sweating, and fast or fluttery heartbeats. Talk with your healthcare provider about ways to keep your blood sugar level steady.      •Do not strain if you are constipated. You may faint if you strain to have a bowel movement. Walking is the best way to get your bowels moving. Eat foods high in fiber to make it easier to have a bowel movement. Good examples are high-fiber cereals, beans, vegetables, and whole-grain breads. Prune juice may help make bowel movements softer.      •Be careful in hot weather. Heat can cause a syncope episode. Limit activity done outside on hot days. Physical activity in hot weather can lead to dehydration. This can cause an episode.

## 2022-02-24 NOTE — ED CDU PROVIDER SUBSEQUENT DAY NOTE - MEDICAL DECISION MAKING DETAILS
75 y/o M; with PMH signif for CHF, COPD, HTN, HLD ; now p/w s/p syncopal event. Pt state he did not eat today, state was outside watching [employer] procession sat in car for 15 minutes when got out from car synopsized for 1-2 when he awoke he had mild dyspnea on exertion.      Syncope  - VSS at this time, may be due to decrease fluids with heat, will monitor on tele, trend troponins, cbc, SS cardio consult, Follows Dr. Patton cardio    CHF  - Torsemide 20 mg  - Lisinopril 20 mg BID  - Diltiazem 120 CD q am  - Carvedilol 25 mg BID  - 1+ pitting edema [id number], unlikely acute CHF    GERD  - Continue on Omeprazole 40 mg q am    HLD  - Continue on Atorvastatin 10 mg q bedtime    BPH  - Continue on Tamsulosin 0.4 mg q am    Nasal fracture with abrasions  - No acute surgical intervention, will give OMF follow up  - Augmentin 875 mg bid for prophylaxis    COPD  - Stable at this time, sating at 99%  DVT prophylaxis SCD

## 2022-02-24 NOTE — ED CDU PROVIDER DISPOSITION NOTE - ATTENDING CONTRIBUTION TO CARE
in obs for syncope eval  all work up is negative  pt to follow up as outpt with cardio and neuro, or pcp

## 2022-02-25 LAB
CULTURE RESULTS: SIGNIFICANT CHANGE UP
SPECIMEN SOURCE: SIGNIFICANT CHANGE UP